# Patient Record
Sex: MALE | Race: BLACK OR AFRICAN AMERICAN | NOT HISPANIC OR LATINO | Employment: STUDENT | URBAN - METROPOLITAN AREA
[De-identification: names, ages, dates, MRNs, and addresses within clinical notes are randomized per-mention and may not be internally consistent; named-entity substitution may affect disease eponyms.]

---

## 2022-09-09 ENCOUNTER — TELEPHONE (OUTPATIENT)
Dept: PODIATRY | Facility: CLINIC | Age: 22
End: 2022-09-09
Payer: MEDICAID

## 2022-09-09 NOTE — TELEPHONE ENCOUNTER
----- Message from Rebecca Chandler sent at 9/9/2022 11:49 AM CDT -----  Contact: Mom@ [phone]  Caller (April) Mom is requesting a call back regarding scheduling appt for pt :Severe Pain in both Foot  Please call to discuss further Attempt schedule pt No available in epic.

## 2022-09-09 NOTE — TELEPHONE ENCOUNTER
Call placed to provided number- No answer.  Message and contact info left on recorder that availability for new patients with his insurance would be in the [facility] or [facility] (not available until 2023)